# Patient Record
Sex: FEMALE | Race: WHITE | NOT HISPANIC OR LATINO | Employment: UNEMPLOYED | URBAN - METROPOLITAN AREA
[De-identification: names, ages, dates, MRNs, and addresses within clinical notes are randomized per-mention and may not be internally consistent; named-entity substitution may affect disease eponyms.]

---

## 2017-01-26 PROBLEM — O09.522 ELDERLY MULTIGRAVIDA IN SECOND TRIMESTER: Status: ACTIVE | Noted: 2017-01-26

## 2024-08-12 ENCOUNTER — OFFICE VISIT (OUTPATIENT)
Dept: OBSTETRICS AND GYNECOLOGY | Facility: CLINIC | Age: 44
End: 2024-08-12
Payer: COMMERCIAL

## 2024-08-12 VITALS
DIASTOLIC BLOOD PRESSURE: 66 MMHG | WEIGHT: 146.38 LBS | BODY MASS INDEX: 26.94 KG/M2 | HEIGHT: 62 IN | SYSTOLIC BLOOD PRESSURE: 110 MMHG

## 2024-08-12 DIAGNOSIS — N92.0 MENORRHAGIA WITH REGULAR CYCLE: ICD-10-CM

## 2024-08-12 DIAGNOSIS — D50.0 IRON DEFICIENCY ANEMIA DUE TO CHRONIC BLOOD LOSS: ICD-10-CM

## 2024-08-12 DIAGNOSIS — Z01.419 ROUTINE GYNECOLOGICAL EXAMINATION: Primary | ICD-10-CM

## 2024-08-12 DIAGNOSIS — Z12.31 ENCOUNTER FOR SCREENING MAMMOGRAM FOR MALIGNANT NEOPLASM OF BREAST: ICD-10-CM

## 2024-08-12 PROBLEM — O09.522 ELDERLY MULTIGRAVIDA IN SECOND TRIMESTER: Status: RESOLVED | Noted: 2017-01-26 | Resolved: 2024-08-12

## 2024-08-12 PROCEDURE — 3008F BODY MASS INDEX DOCD: CPT | Mod: CPTII,S$GLB,, | Performed by: OBSTETRICS & GYNECOLOGY

## 2024-08-12 PROCEDURE — 3078F DIAST BP <80 MM HG: CPT | Mod: CPTII,S$GLB,, | Performed by: OBSTETRICS & GYNECOLOGY

## 2024-08-12 PROCEDURE — 88175 CYTOPATH C/V AUTO FLUID REDO: CPT | Performed by: OBSTETRICS & GYNECOLOGY

## 2024-08-12 PROCEDURE — 99999 PR PBB SHADOW E&M-NEW PATIENT-LVL III: CPT | Mod: PBBFAC,,, | Performed by: OBSTETRICS & GYNECOLOGY

## 2024-08-12 PROCEDURE — 1159F MED LIST DOCD IN RCRD: CPT | Mod: CPTII,S$GLB,, | Performed by: OBSTETRICS & GYNECOLOGY

## 2024-08-12 PROCEDURE — 99386 PREV VISIT NEW AGE 40-64: CPT | Mod: S$GLB,,, | Performed by: OBSTETRICS & GYNECOLOGY

## 2024-08-12 PROCEDURE — 87624 HPV HI-RISK TYP POOLED RSLT: CPT | Performed by: OBSTETRICS & GYNECOLOGY

## 2024-08-12 PROCEDURE — 3074F SYST BP LT 130 MM HG: CPT | Mod: CPTII,S$GLB,, | Performed by: OBSTETRICS & GYNECOLOGY

## 2024-08-12 RX ORDER — ERGOCALCIFEROL 1.25 MG/1
50000 CAPSULE ORAL
COMMUNITY

## 2024-08-12 RX ORDER — LANOLIN ALCOHOL/MO/W.PET/CERES
1 CREAM (GRAM) TOPICAL
COMMUNITY

## 2024-08-12 RX ORDER — TRANEXAMIC ACID 650 MG/1
1300 TABLET ORAL 3 TIMES DAILY
Qty: 12 TABLET | Refills: 11 | Status: SHIPPED | OUTPATIENT
Start: 2024-08-12

## 2024-08-12 NOTE — PROGRESS NOTES
Chief Complaint   Patient presents with    Well Woman       History of Present Illness: Apolonia Kilpatrick is a 43 y.o. female that presents today 2024 with Patient's last menstrual period was 2024 (exact date).  for well gyn visit.  Cycle interval 24 days.     She reports heavy 2 days with lighter for 3 days.     She reports tampon and pad together needed for first two day with changes every 1-2 hours.  She reports anemia    Past Medical History:   Diagnosis Date    Anemia, unspecified        Past Surgical History:   Procedure Laterality Date     SECTION      for fetal distress in labor (documented LTCS)     SECTION      elective rpt LTCS (documented)     SECTION       SECTION      widsom teeth         Outpatient Medications Prior to Visit   Medication Sig Dispense Refill    ergocalciferol (VITAMIN D2) 50,000 unit Cap Take 50,000 Units by mouth every 7 days.      ferrous sulfate (FEOSOL) Tab tablet Take 1 tablet by mouth daily with breakfast.      PNV7/FE ASP GLY/DOCUSATE/FA (PRENATAL VIT #7-IRON-FA-DSS ORAL) Take 1 capsule by mouth once daily. (Patient not taking: Reported on 2024)      progesterone (PROMETRIUM) 100 MG capsule Take 100 mg by mouth once daily. (Patient not taking: Reported on 2024)      progesterone micronized (ENDOMETRIN) 100 mg vaginal insert Place 100 mg vaginally 2 (two) times daily. (Patient not taking: Reported on 2024)       No facility-administered medications prior to visit.       Review of patient's allergies indicates:  No Known Allergies    Family History   Problem Relation Name Age of Onset    Heart murmur Brother      Breast cancer Paternal Aunt          late 60's    Uterine cancer Neg Hx      Cervical cancer Neg Hx         Social History     Socioeconomic History    Marital status:    Tobacco Use    Smoking status: Never   Substance and Sexual Activity    Alcohol use: No    Drug use: No    Sexual  "activity: Yes       OB History    Para Term  AB Living   4 4 4     4   SAB IAB Ectopic Multiple Live Births           4      # Outcome Date GA Lbr Robert/2nd Weight Sex Type Anes PTL Lv   4 Term 2017    F CS-LTranv   PATI   3 Term 13 39w5d  4.082 kg (9 lb) M CS-LTranv  N PATI   2 Term 09 39w0d 02:00 3.175 kg (7 lb) F CS-LTranv EPI N PATI   1 Term 08 39w0d 12:00 3.629 kg (8 lb) F CS-LTranv EPI N PATI      Obstetric Comments   First for [facility]       Review of Symptoms:  GENERAL: Denies weight gain or weight loss. Feeling well overall.   SKIN: Denies rash or lesions.   HEAD: Denies head injury or headache.   NODES: Denies enlarged lymph nodes.   CHEST: Denies chest pain or shortness of breath.   CARDIOVASCULAR: Denies palpitations or left sided chest pain.   ABDOMEN: No abdominal pain, constipation, diarrhea, nausea, vomiting or rectal bleeding.   URINARY: No frequency, dysuria, hematuria, or burning on urination.  HEMATOLOGIC: No easy bruisability or excessive bleeding.   MUSCULOSKELETAL: Denies joint pain or swelling.     /66   Ht 5' 2" (1.575 m)   Wt 66.4 kg (146 lb 6.2 oz)   LMP 2024 (Exact Date)   Physical Exam:  APPEARANCE: Well nourished, well developed, in no acute distress.  SKIN: Normal skin turgor, no lesions.  NECK: Neck symmetric without masses   RESPIRATORY: Normal respiratory effort with no retractions or use of accessory muscles  CARDIOVASCULAR: Peripheral vascular system with no swelling no varicosities and palpation of pulses normal  LYMPHATIC: No enlargements of the lymph nodes noted in the neck, axillae, or groin  ABDOMEN: Soft. No tenderness or masses. No hepatosplenomegaly. No hernias.  BREASTS: Symmetrical, no skin changes or visible lesions. No palpable masses, nipple discharge or adenopathy bilaterally.  PELVIC: Normal external female genitalia without lesions. Normal hair distribution. Adequate perineal body, normal urethral meatus. Urethra with no " masses.  Bladder nontender. Vagina moist and well rugated without lesions or discharge. Cervix pink and without lesions. No significant cystocele or rectocele. Bimanual exam showed uterus normal size, shape, position, mobile and nontender. Adnexa without masses or tenderness. Urethra and bladder normal.   EXTREMITIES: No clubbing cyanosis or edema.    ASSESSMENT/PLAN:  Routine gynecological examination  -     Liquid-Based Pap Smear, Screening  -     HPV High Risk Genotypes, PCR    Menorrhagia with regular cycle  -     US Pelvis Comp with Transvag NON-OB (xpd; Future; Expected date: 08/12/2024  -     tranexamic acid (LYSTEDA) 650 mg tablet; Take 2 tablets (1,300 mg total) by mouth 3 (three) times daily.  Dispense: 12 tablet; Refill: 11    Iron deficiency anemia due to chronic blood loss  -     US Pelvis Comp with Transvag NON-OB (xpd; Future; Expected date: 08/12/2024  -     tranexamic acid (LYSTEDA) 650 mg tablet; Take 2 tablets (1,300 mg total) by mouth 3 (three) times daily.  Dispense: 12 tablet; Refill: 11    Encounter for screening mammogram for malignant neoplasm of breast  -     Mammo Digital Screening Bilat w/ Ben; Future; Expected date: 08/12/2024      We discussed options to help reduce the heavy menstrual bleeding.     Noting that NSAID such as Motrin 600 mg taken scheduled every 8 hours during the menses may reduce menstrual bleeding mildly.     A newer drug named LYSTEDA can be taken during the menses to also reduce bleeding.     Also, birth control in its various forms can reduce menstrual bleeding.  Most commonly used is the pill.  But, there are the other options including the patch or ring that are just as effective.     The MIRENA IUD is a very safe option.  This is approved for not only birth control but also treatment of heavy menstrual cycles.  Its risks are minimal.  Expulsion or perforation being very rare risks.  It is approved to reduce bleeding for up to 5 years.     Next, the endometrial  ABLATION is an outpatient surgical option.  This may stop bleeding 60% of the time.  But, up to 80% will see a significant reduction in bleeding.    Finally, of course Hysterectomy is the most definitive treatment to stop heavy vaginal menses.              Patient was counseled today on Pelvic exams and Pap Smear guidelines.   We discussed STD screening if at high risk for a STD.  We discussed recommendation for breast cancer screening with mammogram every other year after the age of 40 and annually after the age of 50.    We discussed colon cancer screening when indicated.   Osteoporosis screening discussed when indicated.   She was advised to see her primary care physician for all other health maintenance.     FOLLOW-UP with me for next routine visit.

## 2024-08-15 LAB
FINAL PATHOLOGIC DIAGNOSIS: NORMAL
Lab: NORMAL

## 2024-08-17 LAB
HPV HR 12 DNA SPEC QL NAA+PROBE: NEGATIVE
HPV16 AG SPEC QL: NEGATIVE
HPV18 DNA SPEC QL NAA+PROBE: NEGATIVE

## 2024-08-26 ENCOUNTER — HOSPITAL ENCOUNTER (OUTPATIENT)
Dept: RADIOLOGY | Facility: HOSPITAL | Age: 44
Discharge: HOME | End: 2024-08-26
Attending: OBSTETRICS & GYNECOLOGY
Payer: COMMERCIAL

## 2024-08-26 DIAGNOSIS — D50.0 IRON DEFICIENCY ANEMIA DUE TO CHRONIC BLOOD LOSS: ICD-10-CM

## 2024-08-26 DIAGNOSIS — N92.0 MENORRHAGIA WITH REGULAR CYCLE: ICD-10-CM

## 2024-08-26 PROCEDURE — 76856 US EXAM PELVIC COMPLETE: CPT | Mod: 26,,, | Performed by: RADIOLOGY

## 2024-08-26 PROCEDURE — 76830 TRANSVAGINAL US NON-OB: CPT | Mod: 26,,, | Performed by: RADIOLOGY

## 2024-08-26 PROCEDURE — 76856 US EXAM PELVIC COMPLETE: CPT | Mod: TC,PN

## 2024-08-26 PROCEDURE — 76830 TRANSVAGINAL US NON-OB: CPT | Mod: TC,PN

## 2024-12-11 ENCOUNTER — TELEPHONE (OUTPATIENT)
Dept: OBSTETRICS AND GYNECOLOGY | Facility: CLINIC | Age: 44
End: 2024-12-11
Payer: COMMERCIAL

## 2024-12-11 NOTE — TELEPHONE ENCOUNTER
----- Message from Crys sent at 12/11/2024  8:16 AM CST -----  Regarding: Biopsy Orders Placed  Good morning,     Our radiologist, Dr. Casas, has recommended this patient have a right stereotactic breast biopsy.     Orders have been placed on your behalf, please sign off on it as soon as possible.     Report to be finalized by radiologist and sent to your in basket ASAP. Patient to be scheduled as soon as possible.    Thank you,    Crys Ugalde MA    [facility]  Ph #: [phone]           Ext. [phone]  Fax#: [phone]

## 2025-01-28 ENCOUNTER — OFFICE VISIT (OUTPATIENT)
Dept: OBSTETRICS AND GYNECOLOGY | Facility: CLINIC | Age: 45
End: 2025-01-28
Payer: COMMERCIAL

## 2025-01-28 VITALS
WEIGHT: 146.19 LBS | SYSTOLIC BLOOD PRESSURE: 114 MMHG | BODY MASS INDEX: 26.73 KG/M2 | DIASTOLIC BLOOD PRESSURE: 70 MMHG

## 2025-01-28 DIAGNOSIS — N64.89 RADIAL SCAR OF RIGHT BREAST: ICD-10-CM

## 2025-01-28 DIAGNOSIS — D50.0 IRON DEFICIENCY ANEMIA DUE TO CHRONIC BLOOD LOSS: ICD-10-CM

## 2025-01-28 DIAGNOSIS — N92.0 MENORRHAGIA WITH REGULAR CYCLE: Primary | ICD-10-CM

## 2025-01-28 PROCEDURE — 3074F SYST BP LT 130 MM HG: CPT | Mod: CPTII,S$GLB,, | Performed by: OBSTETRICS & GYNECOLOGY

## 2025-01-28 PROCEDURE — 3078F DIAST BP <80 MM HG: CPT | Mod: CPTII,S$GLB,, | Performed by: OBSTETRICS & GYNECOLOGY

## 2025-01-28 PROCEDURE — 1159F MED LIST DOCD IN RCRD: CPT | Mod: CPTII,S$GLB,, | Performed by: OBSTETRICS & GYNECOLOGY

## 2025-01-28 PROCEDURE — 3008F BODY MASS INDEX DOCD: CPT | Mod: CPTII,S$GLB,, | Performed by: OBSTETRICS & GYNECOLOGY

## 2025-01-28 PROCEDURE — 99213 OFFICE O/P EST LOW 20 MIN: CPT | Mod: S$GLB,,, | Performed by: OBSTETRICS & GYNECOLOGY

## 2025-01-28 PROCEDURE — 99999 PR PBB SHADOW E&M-EST. PATIENT-LVL III: CPT | Mod: PBBFAC,,, | Performed by: OBSTETRICS & GYNECOLOGY

## 2025-01-28 NOTE — PROGRESS NOTES
Chief Complaint   Patient presents with    Multidisciplinary Discussion     Ultrasound and mammogram results       History of Present Illness: Apolonia Kilpatrick is a 44 y.o. female that presents today 2025 for   Chief Complaint   Patient presents with    Multidisciplinary Discussion     Ultrasound and mammogram results     She reports desire for second option for breast excision    She reports lighter period since last visit    She has not tried lysteda yet    She plans to repeat her anemia labs for her PCP soon.     She has taken some iron.         Past Medical History:   Diagnosis Date    Anemia, unspecified        Past Surgical History:   Procedure Laterality Date     SECTION      for fetal distress in labor (documented LTCS)     SECTION      elective rpt LTCS (documented)     SECTION       SECTION      widsom teeth         Outpatient Medications Prior to Visit   Medication Sig Dispense Refill    ergocalciferol (VITAMIN D2) 50,000 unit Cap Take 50,000 Units by mouth every 7 days.      ferrous sulfate (FEOSOL) Tab tablet Take 1 tablet by mouth daily with breakfast.      PNV7/FE ASP GLY/DOCUSATE/FA (PRENATAL VIT #7-IRON-FA-DSS ORAL) Take 1 capsule by mouth once daily. (Patient not taking: Reported on 2025)      tranexamic acid (LYSTEDA) 650 mg tablet Take 2 tablets (1,300 mg total) by mouth 3 (three) times daily. (Patient not taking: Reported on 2024) 12 tablet 11     No facility-administered medications prior to visit.       Review of patient's allergies indicates:  No Known Allergies    Family History   Problem Relation Name Age of Onset    Prostate cancer Father  60    Heart murmur Brother      Breast cancer Paternal Aunt  60        late 60's    Lung cancer Maternal Grandfather  70        smoker    Uterine cancer Neg Hx      Cervical cancer Neg Hx         Social History     Tobacco Use    Smoking status: Never    Smokeless tobacco: Never    Substance Use Topics    Alcohol use: No    Drug use: No       OB History    Para Term  AB Living   4 4 4     4   SAB IAB Ectopic Multiple Live Births           4      # Outcome Date GA Lbr Robert/2nd Weight Sex Type Anes PTL Lv   4 Term 2017    F CS-LTranv   PATI   3 Term 13 39w5d  4.082 kg (9 lb) M CS-LTranv  N PATI   2 Term 09 39w0d 02:00 3.175 kg (7 lb) F CS-LTranv EPI N PATI   1 Term 08 39w0d 12:00 3.629 kg (8 lb) F CS-LTranv EPI N PATI      Obstetric Comments   First for [facility]         /70   Wt 66.3 kg (146 lb 2.6 oz)   LMP 2025       ASSESSMENT/PLAN:  Menorrhagia with regular cycle  Comments:  will consider trial of lysteda  USG reviewed    Iron deficiency anemia due to chronic blood loss  Comments:  repeating labs soon  taking oral iron    Radial scar of right breast  Comments:  discussed pathology and excision and questions answered to her satisfaction          20 minutes spent today preparing reviewing previous external notes, reviewing previous results, performing medical examination, orders tests or medications, counseling and documenting.